# Patient Record
Sex: MALE | Race: WHITE | NOT HISPANIC OR LATINO | URBAN - METROPOLITAN AREA
[De-identification: names, ages, dates, MRNs, and addresses within clinical notes are randomized per-mention and may not be internally consistent; named-entity substitution may affect disease eponyms.]

---

## 2017-08-07 ENCOUNTER — EMERGENCY (EMERGENCY)
Facility: HOSPITAL | Age: 32
LOS: 1 days | Discharge: HOME | End: 2017-08-07
Attending: EMERGENCY MEDICINE | Admitting: EMERGENCY MEDICINE
Payer: SELF-PAY

## 2017-08-07 VITALS — TEMPERATURE: 99 F

## 2017-08-07 VITALS
TEMPERATURE: 101 F | OXYGEN SATURATION: 100 % | HEART RATE: 102 BPM | RESPIRATION RATE: 20 BRPM | SYSTOLIC BLOOD PRESSURE: 121 MMHG | DIASTOLIC BLOOD PRESSURE: 75 MMHG

## 2017-08-07 DIAGNOSIS — R50.9 FEVER, UNSPECIFIED: ICD-10-CM

## 2017-08-07 DIAGNOSIS — R10.11 RIGHT UPPER QUADRANT PAIN: ICD-10-CM

## 2017-08-07 LAB
ALBUMIN SERPL ELPH-MCNC: 3.8 G/DL — SIGNIFICANT CHANGE UP (ref 3.4–5)
ALP SERPL-CCNC: 70 U/L — SIGNIFICANT CHANGE UP (ref 40–120)
ALT FLD-CCNC: 41 U/L — SIGNIFICANT CHANGE UP (ref 12–42)
ANION GAP SERPL CALC-SCNC: 9 MMOL/L — SIGNIFICANT CHANGE UP (ref 9–16)
APPEARANCE UR: CLEAR — SIGNIFICANT CHANGE UP
APTT BLD: 41.6 SEC — HIGH (ref 27.5–36.5)
AST SERPL-CCNC: 37 U/L — SIGNIFICANT CHANGE UP (ref 15–37)
BASOPHILS NFR BLD AUTO: 0.9 % — SIGNIFICANT CHANGE UP (ref 0–2)
BILIRUB SERPL-MCNC: 1.1 MG/DL — SIGNIFICANT CHANGE UP (ref 0.2–1.2)
BILIRUB UR-MCNC: NEGATIVE — SIGNIFICANT CHANGE UP
BUN SERPL-MCNC: 12 MG/DL — SIGNIFICANT CHANGE UP (ref 7–23)
CALCIUM SERPL-MCNC: 9.1 MG/DL — SIGNIFICANT CHANGE UP (ref 8.5–10.5)
CHLORIDE SERPL-SCNC: 97 MMOL/L — SIGNIFICANT CHANGE UP (ref 96–108)
CO2 SERPL-SCNC: 28 MMOL/L — SIGNIFICANT CHANGE UP (ref 22–31)
COLOR SPEC: YELLOW — SIGNIFICANT CHANGE UP
CREAT SERPL-MCNC: 1.45 MG/DL — HIGH (ref 0.5–1.3)
D DIMER BLD IA.RAPID-MCNC: 1848 NG/ML DDU — HIGH
DIFF PNL FLD: NEGATIVE — SIGNIFICANT CHANGE UP
EOSINOPHIL NFR BLD AUTO: 0.1 % — SIGNIFICANT CHANGE UP (ref 0–6)
GLUCOSE SERPL-MCNC: 127 MG/DL — HIGH (ref 70–99)
GLUCOSE UR QL: NEGATIVE — SIGNIFICANT CHANGE UP
HCT VFR BLD CALC: 47.7 % — SIGNIFICANT CHANGE UP (ref 39–50)
HGB BLD-MCNC: 16.4 G/DL — SIGNIFICANT CHANGE UP (ref 13–17)
IMM GRANULOCYTES NFR BLD AUTO: 0.2 % — SIGNIFICANT CHANGE UP (ref 0–1.5)
INR BLD: 1.34 — HIGH (ref 0.88–1.16)
KETONES UR-MCNC: NEGATIVE — SIGNIFICANT CHANGE UP
LACTATE SERPL-SCNC: 1.5 MMOL/L — SIGNIFICANT CHANGE UP (ref 0.4–2)
LEUKOCYTE ESTERASE UR-ACNC: NEGATIVE — SIGNIFICANT CHANGE UP
LIDOCAIN IGE QN: 87 U/L — SIGNIFICANT CHANGE UP (ref 73–393)
LYMPHOCYTES # BLD AUTO: 36.3 % — SIGNIFICANT CHANGE UP (ref 13–44)
MCHC RBC-ENTMCNC: 31.1 PG — SIGNIFICANT CHANGE UP (ref 27–34)
MCHC RBC-ENTMCNC: 34.4 G/DL — SIGNIFICANT CHANGE UP (ref 32–36)
MCV RBC AUTO: 90.3 FL — SIGNIFICANT CHANGE UP (ref 80–100)
MONOCYTES NFR BLD AUTO: 10 % — SIGNIFICANT CHANGE UP (ref 2–14)
NEUTROPHILS NFR BLD AUTO: 52.5 % — SIGNIFICANT CHANGE UP (ref 43–77)
NITRITE UR-MCNC: NEGATIVE — SIGNIFICANT CHANGE UP
PH UR: 6.5 — SIGNIFICANT CHANGE UP (ref 5–8)
PLATELET # BLD AUTO: 142 K/UL — LOW (ref 150–400)
POTASSIUM SERPL-MCNC: 4.4 MMOL/L — SIGNIFICANT CHANGE UP (ref 3.5–5.3)
POTASSIUM SERPL-SCNC: 4.4 MMOL/L — SIGNIFICANT CHANGE UP (ref 3.5–5.3)
PROT SERPL-MCNC: 8.3 G/DL — HIGH (ref 6.4–8.2)
PROT UR-MCNC: 30 MG/DL
PROTHROM AB SERPL-ACNC: 14.8 SEC — HIGH (ref 9.8–12.7)
RBC # BLD: 5.28 M/UL — SIGNIFICANT CHANGE UP (ref 4.2–5.8)
RBC # FLD: 11.9 % — SIGNIFICANT CHANGE UP (ref 10.3–16.9)
SODIUM SERPL-SCNC: 134 MMOL/L — SIGNIFICANT CHANGE UP (ref 132–145)
SP GR SPEC: 1.01 — SIGNIFICANT CHANGE UP (ref 1–1.03)
UROBILINOGEN FLD QL: 0.2 E.U./DL — SIGNIFICANT CHANGE UP
WBC # BLD: 8.1 K/UL — SIGNIFICANT CHANGE UP (ref 3.8–10.5)
WBC # FLD AUTO: 8.1 K/UL — SIGNIFICANT CHANGE UP (ref 3.8–10.5)

## 2017-08-07 PROCEDURE — 93010 ELECTROCARDIOGRAM REPORT: CPT

## 2017-08-07 PROCEDURE — 99285 EMERGENCY DEPT VISIT HI MDM: CPT | Mod: 25

## 2017-08-07 PROCEDURE — 76705 ECHO EXAM OF ABDOMEN: CPT | Mod: 26

## 2017-08-07 PROCEDURE — 71275 CT ANGIOGRAPHY CHEST: CPT | Mod: 26

## 2017-08-07 RX ORDER — PIPERACILLIN AND TAZOBACTAM 4; .5 G/20ML; G/20ML
3.38 INJECTION, POWDER, LYOPHILIZED, FOR SOLUTION INTRAVENOUS ONCE
Qty: 0 | Refills: 0 | Status: COMPLETED | OUTPATIENT
Start: 2017-08-07 | End: 2017-08-07

## 2017-08-07 RX ORDER — SODIUM CHLORIDE 9 MG/ML
500 INJECTION INTRAMUSCULAR; INTRAVENOUS; SUBCUTANEOUS
Qty: 0 | Refills: 0 | Status: COMPLETED | OUTPATIENT
Start: 2017-08-07 | End: 2017-08-07

## 2017-08-07 RX ORDER — SODIUM CHLORIDE 9 MG/ML
3 INJECTION INTRAMUSCULAR; INTRAVENOUS; SUBCUTANEOUS ONCE
Qty: 0 | Refills: 0 | Status: COMPLETED | OUTPATIENT
Start: 2017-08-07 | End: 2017-08-07

## 2017-08-07 RX ORDER — ACETAMINOPHEN 500 MG
650 TABLET ORAL ONCE
Qty: 0 | Refills: 0 | Status: COMPLETED | OUTPATIENT
Start: 2017-08-07 | End: 2017-08-07

## 2017-08-07 RX ORDER — IBUPROFEN 200 MG
600 TABLET ORAL ONCE
Qty: 0 | Refills: 0 | Status: COMPLETED | OUTPATIENT
Start: 2017-08-07 | End: 2017-08-07

## 2017-08-07 RX ADMIN — Medication 600 MILLIGRAM(S): at 20:39

## 2017-08-07 RX ADMIN — SODIUM CHLORIDE 2000 MILLILITER(S): 9 INJECTION INTRAMUSCULAR; INTRAVENOUS; SUBCUTANEOUS at 17:25

## 2017-08-07 RX ADMIN — PIPERACILLIN AND TAZOBACTAM 200 GRAM(S): 4; .5 INJECTION, POWDER, LYOPHILIZED, FOR SOLUTION INTRAVENOUS at 17:38

## 2017-08-07 RX ADMIN — SODIUM CHLORIDE 2000 MILLILITER(S): 9 INJECTION INTRAMUSCULAR; INTRAVENOUS; SUBCUTANEOUS at 18:20

## 2017-08-07 RX ADMIN — SODIUM CHLORIDE 2000 MILLILITER(S): 9 INJECTION INTRAMUSCULAR; INTRAVENOUS; SUBCUTANEOUS at 18:05

## 2017-08-07 RX ADMIN — SODIUM CHLORIDE 3 MILLILITER(S): 9 INJECTION INTRAMUSCULAR; INTRAVENOUS; SUBCUTANEOUS at 17:25

## 2017-08-07 RX ADMIN — SODIUM CHLORIDE 2000 MILLILITER(S): 9 INJECTION INTRAMUSCULAR; INTRAVENOUS; SUBCUTANEOUS at 17:40

## 2017-08-07 RX ADMIN — SODIUM CHLORIDE 2000 MILLILITER(S): 9 INJECTION INTRAMUSCULAR; INTRAVENOUS; SUBCUTANEOUS at 18:35

## 2017-08-07 RX ADMIN — Medication 650 MILLIGRAM(S): at 17:40

## 2017-08-07 NOTE — ED ADULT NURSE REASSESSMENT NOTE - NS ED NURSE REASSESS COMMENT FT1
pt. aaox3, has no complaints at this time. shows no signs of acute distress. pt. made aware to follow up with MD on thrusday as per discharge instructions

## 2017-08-07 NOTE — ED ADULT NURSE NOTE - OBJECTIVE STATEMENT
Patient is a 31 y/o male presenting to the ED with fever and RUQ pain. Patient is a 31 y/o male presenting to the ED with fever and RUQ pain. Patient reports worsening RUQ pain during inspiration. Patient admits to recent travel to [place]. Patient reports a dull RUQ pain, fever, weakness, and fatigue. Patient denies CP, SOB, urinary/bowel s/s, sick contacts. Sepsis Code initiated. MD & RN at bedside. EKG done. Patient in NAD, speaking in full sentences, and will continue to monitor.

## 2017-08-07 NOTE — ED PROVIDER NOTE - OBJECTIVE STATEMENT
31 y/o M sent from PMD's office for fever x 4 days. Tmax 102 this morning. Reports associated RUQ dull pain/pressure. Pain is worse with deep inspiration and coughing. Also states he has been urinating less but denies dysuria or hematuria. Endorses history of kidney stones. Normal appetite. No cough, HA, testicular pain/swelling, nausea or vomiting. Reports loose stools but has been on a liquid diet since he developed fever. 33 y/o M sent from PMD's office for fever x 4 days. Tmax 102 this morning. Reports associated RUQ dull pain/pressure. Pain is worse with deep inspiration and coughing. Also states he has been urinating less but denies dysuria or hematuria. Endorses history of kidney stones but reports this feels much different. Normal appetite. No cough, HA, testicular pain/swelling, nausea or vomiting. Reports loose stools but has been on a liquid diet since he developed fever.

## 2017-08-07 NOTE — ED PROVIDER NOTE - PROGRESS NOTE DETAILS
Pt is feeling much better.  Fever continues to wax/wane.  Re-checked now and 100.2.  Pt requesting discharge.  Pain resolved and urinating well.  Discussed all results including incidental findings.  Discussed that while we ruled out many dangerous diagnoses today there si still no clear etiology of his fever.  He understands and knows that he needs to f/u with his PCP, Dr. Franklin, by this Thursday.  Emergent return precautions discussed.  Will not continue abx because no source found.  Cultures will be followed.  All results printed and given to patient to take to his PCP for review and to schedule f/u imaging. Pt is feeling much better.  Fever continues to wax/wane.  Re-checked now and 100.2.  Pt requesting discharge.  Pain resolved and urinating well.  Discussed all results including incidental findings.  Discussed that while we ruled out many dangerous diagnoses today there is still no clear etiology of his fever.  He understands and knows that he needs to f/u with his PCP, Dr. Franklin, by this Thursday.  Emergent return precautions discussed.  Will not continue abx because no source found.  Cultures will be followed.  All results printed and given to patient to take to his PCP for review and to schedule f/u imaging.

## 2017-08-07 NOTE — ED PROVIDER NOTE - GASTROINTESTINAL, MLM
Mild tenderness to RUQ. Negative Strauss's sign. Mild tenderness to RUQ. Negative Strauss's sign.  Abd soft and non-distended.

## 2017-08-07 NOTE — ED PROVIDER NOTE - MEDICAL DECISION MAKING DETAILS
Pt with fevers and RUQ pain.  Given triage VS sepsis protocol initiated - aggressive weight based hydration and abx given.   Labs and cultures sent.  RUQ US performed to evaluate for GB pathology.  Given location, d-dimer sent to r/o PE to [facility] - positive so CTA chest performed.  UA negative and CT went through kidneys and no e/o hydro.  No e/o PE.  Pt's VS improved and sxs resolved.  Possible viral syndrome vs occult infection.  Will have him monitor sxs and temp closely and f/u with his PCP - he reports he will call tomorrow to make a f/u appt.

## 2017-08-07 NOTE — ED ADULT TRIAGE NOTE - CHIEF COMPLAINT QUOTE
Pt send from pmd for fevers and abd pain; pt is well appearing, febrile 100.5 oral one hour after taking ibuprofen tmax 103 oral today

## 2017-08-08 LAB — RAPID RVP RESULT: SIGNIFICANT CHANGE UP

## 2017-08-13 LAB
CULTURE RESULTS: SIGNIFICANT CHANGE UP
CULTURE RESULTS: SIGNIFICANT CHANGE UP
SPECIMEN SOURCE: SIGNIFICANT CHANGE UP
SPECIMEN SOURCE: SIGNIFICANT CHANGE UP

## 2021-04-07 PROBLEM — N20.0 CALCULUS OF KIDNEY: Chronic | Status: ACTIVE | Noted: 2017-08-07

## 2021-04-08 ENCOUNTER — APPOINTMENT (OUTPATIENT)
Dept: OTOLARYNGOLOGY | Facility: CLINIC | Age: 36
End: 2021-04-08
Payer: COMMERCIAL

## 2021-04-08 VITALS
TEMPERATURE: 97.9 F | HEIGHT: 73 IN | WEIGHT: 190 LBS | DIASTOLIC BLOOD PRESSURE: 75 MMHG | OXYGEN SATURATION: 100 % | SYSTOLIC BLOOD PRESSURE: 125 MMHG | BODY MASS INDEX: 25.18 KG/M2 | HEART RATE: 68 BPM

## 2021-04-08 DIAGNOSIS — Z78.9 OTHER SPECIFIED HEALTH STATUS: ICD-10-CM

## 2021-04-08 DIAGNOSIS — H61.21 IMPACTED CERUMEN, RIGHT EAR: ICD-10-CM

## 2021-04-08 DIAGNOSIS — Z72.89 OTHER PROBLEMS RELATED TO LIFESTYLE: ICD-10-CM

## 2021-04-08 PROBLEM — Z00.00 ENCOUNTER FOR PREVENTIVE HEALTH EXAMINATION: Status: ACTIVE | Noted: 2021-04-08

## 2021-04-08 PROCEDURE — 99072 ADDL SUPL MATRL&STAF TM PHE: CPT

## 2021-04-08 PROCEDURE — 99202 OFFICE O/P NEW SF 15 MIN: CPT

## 2021-04-08 NOTE — PHYSICAL EXAM
[Use of Plain Language] : use of plain language [Adequate] : adequate [de-identified] : l normal; r complete cerumen impaction removed atraumatically with suction- eac and tm normal [Normal] : assessment of respiratory effort is normal [de-identified] : gait steady

## 2021-04-08 NOTE — ASSESSMENT
[FreeTextEntry1] : cerumen removed\par felt better\par questions answered\par offered  - he could not wait - asked to call if he notices hl and we well schedule one for him - he agreed\par rtc as needed

## 2021-04-08 NOTE — HISTORY OF PRESENT ILLNESS
[de-identified] : 34 yo man recently had left cerumen impaction and went to [facility] MD where it was removed. Two physicians have seen his r ear and have noticed that it is completely impacted with cerumen. He denies hl or tinnitus. Denies qtip use or other cause. No fh or sh relevant to cc. Before recently he has not needed to have this done.

## 2021-06-06 NOTE — ED ADULT NURSE REASSESSMENT NOTE - NEURO WDL
Alert and oriented to person, place and time, memory intact, behavior appropriate to situation, PERRL.
Discharged

## 2022-05-03 ENCOUNTER — APPOINTMENT (OUTPATIENT)
Dept: OTOLARYNGOLOGY | Facility: CLINIC | Age: 37
End: 2022-05-03
Payer: SELF-PAY

## 2022-05-03 VITALS
RESPIRATION RATE: 16 BRPM | OXYGEN SATURATION: 100 % | HEIGHT: 73 IN | HEART RATE: 62 BPM | BODY MASS INDEX: 25.18 KG/M2 | DIASTOLIC BLOOD PRESSURE: 83 MMHG | SYSTOLIC BLOOD PRESSURE: 125 MMHG | TEMPERATURE: 98 F | WEIGHT: 190 LBS

## 2022-05-03 DIAGNOSIS — H61.23 IMPACTED CERUMEN, BILATERAL: ICD-10-CM

## 2022-05-03 PROCEDURE — 99212 OFFICE O/P EST SF 10 MIN: CPT

## 2022-05-03 NOTE — PROCEDURE
[Cerumen Impaction] : Cerumen Impaction [Same] : same as the Pre Op Dx. [] : Removal of Cerumen [FreeTextEntry5] : L copious cerumen removed atraumatically with suction under microscope, R scant cerumen removed atraumatically with curette, TMs intact

## 2022-05-03 NOTE — PHYSICAL EXAM
[Normal] : no rashes [de-identified] : L copious cerumen removed atraumatically with suction under microscope, R scant cerumen removed atraumatically with curette, TMs normal [de-identified] : gait steady

## 2022-05-03 NOTE — ASSESSMENT
[FreeTextEntry1] : b cerumen impaction\par -cerumen removed\par -ear felt better\par -avoid Q tips; can use debrox if ear feels plugged\par RTC as needed

## 2022-05-03 NOTE — HISTORY OF PRESENT ILLNESS
[de-identified] : 1 month followup for this 35 y/o M presenting with L aural fullness for the past week. He recently went to [facility], [place] where he became symptomatic - he cj improved over 3d feels hearing is 90%. He does not use Q tips. He denies pain or drainage.

## 2025-01-21 NOTE — ED PROVIDER NOTE - TOBACCO USE
Left message for patient that his appointment with Dr. Moreira tomorrow has been canceled due to the weather.  We will call back to reschedule.   
[facility]
Unknown if ever smoked